# Patient Record
Sex: FEMALE | Race: WHITE | NOT HISPANIC OR LATINO | URBAN - METROPOLITAN AREA
[De-identification: names, ages, dates, MRNs, and addresses within clinical notes are randomized per-mention and may not be internally consistent; named-entity substitution may affect disease eponyms.]

---
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
## 2020-07-19 ENCOUNTER — ERX REFILL RESPONSE (OUTPATIENT)
Age: 20
End: 2020-07-19

## 2020-07-19 RX ORDER — OMEPRAZOLE 40 MG/1
TAKE 1 CAPSULE BY MOUTH EVERY DAY BEFORE A MEAL CAPSULE, DELAYED RELEASE ORAL
Qty: 90 CAPSULE | Refills: 2 | OUTPATIENT

## 2020-07-19 RX ORDER — OMEPRAZOLE 40 MG/1
TAKE 1 CAPSULE BY MOUTH EVERY DAY BEFORE A MEAL CAPSULE, DELAYED RELEASE ORAL
Qty: 30 | Refills: 5 | OUTPATIENT

## 2020-07-28 ENCOUNTER — OFFICE VISIT (OUTPATIENT)
Dept: URBAN - METROPOLITAN AREA TELEHEALTH 2 | Facility: TELEHEALTH | Age: 20
End: 2020-07-28

## 2020-07-28 RX ORDER — HYOSCYAMINE SULFATE 0.12 MG/1
PLACE 1 TABLET UNDER TONGUE BY TRANSLINGUAL ROUTE 2 TIMES A DAY FOR 90 DAYS TABLET, ORALLY DISINTEGRATING ORAL 2
Qty: 180 | Refills: 3 | Status: ACTIVE | COMMUNITY
Start: 2020-05-28 | End: 2021-05-23

## 2020-07-28 RX ORDER — DULOXETINE HCL 20 MG
TAKE 1 CAPSULE (20 MG) BY ORAL ROUTE 2 TIMES PER DAY CAPSULE,DELAYED RELEASE (ENTERIC COATED) ORAL 2
Qty: 0 | Refills: 0 | Status: ACTIVE | COMMUNITY
Start: 1900-01-01

## 2020-07-28 RX ORDER — ONDANSETRON HYDROCHLORIDE 4 MG/1
TAKE 1 TABLETS (4 MG) BY ORAL ROUTE EVERY 8 HOURS AS NEEDED TABLET, FILM COATED ORAL 2
Qty: 60 | Refills: 5 | Status: ACTIVE | COMMUNITY
Start: 2020-05-28 | End: 2020-11-24

## 2020-07-29 ENCOUNTER — OFFICE VISIT (OUTPATIENT)
Dept: URBAN - METROPOLITAN AREA TELEHEALTH 2 | Facility: TELEHEALTH | Age: 20
End: 2020-07-29
Payer: COMMERCIAL

## 2020-07-29 DIAGNOSIS — K58.9 IRRITABLE BOWEL SYNDROME, UNSPECIFIED TYPE: ICD-10-CM

## 2020-07-29 DIAGNOSIS — K31.84 GASTROPARESIS: ICD-10-CM

## 2020-07-29 DIAGNOSIS — R11.0 NAUSEA: ICD-10-CM

## 2020-07-29 DIAGNOSIS — R10.13 DYSPEPSIA: ICD-10-CM

## 2020-07-29 PROCEDURE — G9903 PT SCRN TBCO ID AS NON USER: HCPCS | Performed by: INTERNAL MEDICINE

## 2020-07-29 PROCEDURE — G8427 DOCREV CUR MEDS BY ELIG CLIN: HCPCS | Performed by: INTERNAL MEDICINE

## 2020-07-29 PROCEDURE — G8420 CALC BMI NORM PARAMETERS: HCPCS | Performed by: INTERNAL MEDICINE

## 2020-07-29 PROCEDURE — 1036F TOBACCO NON-USER: CPT | Performed by: INTERNAL MEDICINE

## 2020-07-29 PROCEDURE — 99214 OFFICE O/P EST MOD 30 MIN: CPT | Performed by: INTERNAL MEDICINE

## 2020-07-29 RX ORDER — DESIPRAMINE HYDROCHLORIDE 10 MG/1
1 TABLET TABLET, SUGAR COATED ORAL ONCE A DAY
Qty: 90 | Refills: 0 | OUTPATIENT
Start: 2020-07-29

## 2020-07-29 RX ORDER — OMEPRAZOLE 40 MG/1
TAKE 1 CAPSULE BY MOUTH EVERY DAY BEFORE A MEAL CAPSULE, DELAYED RELEASE ORAL
Qty: 90 CAPSULE | Refills: 2 | Status: ACTIVE | COMMUNITY

## 2020-07-29 RX ORDER — ONDANSETRON HYDROCHLORIDE 4 MG/1
TAKE 1 TABLETS (4 MG) BY ORAL ROUTE EVERY 8 HOURS AS NEEDED TABLET, FILM COATED ORAL 2
Qty: 60 | Refills: 5 | Status: ACTIVE | COMMUNITY
Start: 2020-05-28 | End: 2020-11-24

## 2020-07-29 RX ORDER — DULOXETINE HCL 20 MG
TAKE 1 CAPSULE (20 MG) BY ORAL ROUTE 2 TIMES PER DAY CAPSULE,DELAYED RELEASE (ENTERIC COATED) ORAL 2
Qty: 0 | Refills: 0 | Status: ACTIVE | COMMUNITY
Start: 1900-01-01

## 2020-07-29 RX ORDER — HYOSCYAMINE SULFATE 0.12 MG/1
PLACE 1 TABLET UNDER TONGUE BY TRANSLINGUAL ROUTE 2 TIMES A DAY FOR 90 DAYS TABLET, ORALLY DISINTEGRATING ORAL 2
Qty: 180 | Refills: 3 | Status: ACTIVE | COMMUNITY
Start: 2020-05-28 | End: 2021-05-23

## 2020-07-29 NOTE — HPI-OTHER HISTORIES
This is a 20yoF seen by Shamika PHILLIPS in [place] in 2019 then by myself in 7/2019 for eval of abd pain which began after a UTI and URI. EGD 1/30/2019 by Dr. Saldaña revealed a small HH and gastritis which was bx'd.  Path was   negative for H. pylori and Celiac. RUQ U/S on 2/13/2019 showed normal gallbladder, no ductal dilatation. She was admitted x 1 week to [facility] for eval of chronic abd pain, hematochezia and weight loss in Feb 2019. Records reviewed: EGD few superficial gastric and duodenal ulcers, bx neg for HP; Colon same day with discontinuous areas of nonbleeding ulcerated mucosa in rectum, sigmoid and cecum, normal TI. bx + increased lymphoplastmacytic inflammation in the cecum and ascending colon but no granuloma, crypt distortion or active inflammation. Capsule endoscopy with small ulcers of stomach, duodenum and colon, MRE with no evidence of IBD; calprotectin 181 and CRP 4.6 Pt had a slow GES on 7/26/19- T1/2 of 132 min (normal < 90min) IBD panel not c/w IBD  She presented back in April and had overall been doing "ok" for the last year but still has days with bad adbominal cramping, nausea and diarrhea. Pain was worse after eating and is generalized. She never feels good but has "bad GI" days about once/week. In addition to GI she has been multiple MDs-urology, neurology, gyn_ _recurrent UTIs, cervicitis and yeast infections and has trouble ridding the infections. + migraines. Tried oxybutinin which worsened her GI symptoms so stopped this.  EGD/colon 5/15/2020: normal to TI and normal stomach, esophagus and duodenum Bx gastritis without HP and neg ileitis and colitis  Started her on PPI, levsin and zofran. She used levsin daily but felt it become less effective. Omeprazole did seem to help  but stopped it as no more heartburn or regurg.  Still with LUQ and lower abd pain worse after eating, especially larger meals. Instead of feeling full gets pain feeling and dyspepsia--tried gastroparesis diet and helped some. Associated nausea without vomiting. Bowels normal without bleeding, no more diarrhea. Definately feels like anxiety is adding to GI sx as well as HA.

## 2020-10-05 ENCOUNTER — OFFICE VISIT (OUTPATIENT)
Dept: URBAN - METROPOLITAN AREA TELEHEALTH 2 | Facility: TELEHEALTH | Age: 20
End: 2020-10-05
Payer: COMMERCIAL

## 2020-10-05 DIAGNOSIS — R10.13 DYSPEPSIA: ICD-10-CM

## 2020-10-05 DIAGNOSIS — K29.80 ACUTE DUODENITIS: ICD-10-CM

## 2020-10-05 DIAGNOSIS — R11.0 NAUSEA: ICD-10-CM

## 2020-10-05 DIAGNOSIS — K31.84 GASTROPARESIS: ICD-10-CM

## 2020-10-05 PROCEDURE — G8420 CALC BMI NORM PARAMETERS: HCPCS | Performed by: INTERNAL MEDICINE

## 2020-10-05 PROCEDURE — G8427 DOCREV CUR MEDS BY ELIG CLIN: HCPCS | Performed by: INTERNAL MEDICINE

## 2020-10-05 PROCEDURE — G8482 FLU IMMUNIZE ORDER/ADMIN: HCPCS | Performed by: INTERNAL MEDICINE

## 2020-10-05 PROCEDURE — G9903 PT SCRN TBCO ID AS NON USER: HCPCS | Performed by: INTERNAL MEDICINE

## 2020-10-05 PROCEDURE — 1036F TOBACCO NON-USER: CPT | Performed by: INTERNAL MEDICINE

## 2020-10-05 PROCEDURE — 99214 OFFICE O/P EST MOD 30 MIN: CPT | Performed by: INTERNAL MEDICINE

## 2020-10-05 RX ORDER — DESIPRAMINE HYDROCHLORIDE 10 MG/1
1 TABLET TABLET, SUGAR COATED ORAL ONCE A DAY
Qty: 90 | Refills: 0 | OUTPATIENT

## 2020-10-05 RX ORDER — OMEPRAZOLE 40 MG/1
TAKE 1 CAPSULE BY MOUTH EVERY DAY BEFORE A MEAL CAPSULE, DELAYED RELEASE ORAL
Qty: 90 CAPSULE | Refills: 2 | Status: ACTIVE | COMMUNITY

## 2020-10-05 RX ORDER — DESIPRAMINE HYDROCHLORIDE 10 MG/1
1 TABLET TABLET, SUGAR COATED ORAL ONCE A DAY
Qty: 90 | Refills: 0 | Status: ACTIVE | COMMUNITY
Start: 2020-07-29

## 2020-10-05 RX ORDER — ONDANSETRON HYDROCHLORIDE 4 MG/1
TAKE 1 TABLETS (4 MG) BY ORAL ROUTE EVERY 8 HOURS AS NEEDED TABLET, FILM COATED ORAL 2
Qty: 60 | Refills: 5 | Status: ACTIVE | COMMUNITY
Start: 2020-05-28 | End: 2020-11-24

## 2020-10-05 RX ORDER — DULOXETINE HCL 20 MG
TAKE 1 CAPSULE (20 MG) BY ORAL ROUTE 2 TIMES PER DAY CAPSULE,DELAYED RELEASE (ENTERIC COATED) ORAL 2
Qty: 0 | Refills: 0 | Status: ACTIVE | COMMUNITY
Start: 1900-01-01

## 2020-10-05 RX ORDER — HYOSCYAMINE SULFATE 0.12 MG/1
PLACE 1 TABLET UNDER TONGUE BY TRANSLINGUAL ROUTE 2 TIMES A DAY FOR 90 DAYS TABLET, ORALLY DISINTEGRATING ORAL 2
Qty: 180 | Refills: 3 | Status: ACTIVE | COMMUNITY
Start: 2020-05-28 | End: 2021-05-23

## 2020-10-05 NOTE — HPI-OTHER HISTORIES
This is a 20yoF seen by Shamika PHILLIPS in [place] in 2019 then by myself in 7/2019 for eval of abd pain which began after a UTI and URI. EGD 1/30/2019 by Dr. Saldaña revealed a small HH and gastritis which was bx'd.  Path was     negative for H. pylori and Celiac. RUQ U/S on 2/13/2019 showed normal gallbladder, no ductal dilatation. She  was admitted x 1 week to [facility] for eval of chronic abd pain, hematochezia and weight loss in Feb 2019. Records reviewed: EGD few superficial gastric and duodenal ulcers, bx neg for HP; Colon same day with discontinuous areas of nonbleeding ulcerated mucosa in rectum, sigmoid and cecum, normal TI. bx + increased lymphoplastmacytic inflammation in the cecum and ascending colon but no granuloma, crypt distortion or active inflammation. Capsule endoscopy with small ulcers of stomach, duodenum and colon, MRE with no evidence of IBD; calprotectin 181 and CRP 4.6 Pt had a slow GES on 7/26/19- T1/2 of 132 min (normal < 90min) IBD panel not c/w IBD  She presented back in April with some bad adbominal cramping, nausea and diarrhea. Pain was worse after eating and is generalized. In addition to GI she has been multiple MDs-urology, neurology, gyn--recurrent UTIs, cervicitis and yeast infections and has trouble ridding the infections. + migraines. Still seeing urology and gyn.  EGD/colon 5/15/2020: normal to TI and normal stomach, esophagus and duodenum Bx gastritis without HP and neg ileitis and colitis  She was given norpramin but actually felt increased anxiety stop stopped it but wants to re-try it as thinks maybe anxiety was just higher at that time. Using FDgard BID and gastroparesis diet, which has helped with her post-prandial pain, fullness and nausea. BM daily and no GIB. Still notes with anxiety her GI sx increase-swetha the pain. No vomiting or weight loss. Does have some dyspepsia during the day without regurg or dysphagia.

## 2020-10-30 ENCOUNTER — ERX REFILL RESPONSE (OUTPATIENT)
Dept: URBAN - METROPOLITAN AREA TELEHEALTH 2 | Facility: TELEHEALTH | Age: 20
End: 2020-10-30

## 2020-10-30 RX ORDER — DESIPRAMINE HYDROCHLORIDE 10 MG/1
1 TABLET TABLET ORAL ONCE A DAY
Qty: 90 | Refills: 0

## 2020-11-16 ENCOUNTER — OFFICE VISIT (OUTPATIENT)
Dept: URBAN - METROPOLITAN AREA TELEHEALTH 2 | Facility: TELEHEALTH | Age: 20
End: 2020-11-16
Payer: COMMERCIAL

## 2020-11-16 DIAGNOSIS — K58.9 IRRITABLE BOWEL SYNDROME, UNSPECIFIED TYPE: ICD-10-CM

## 2020-11-16 DIAGNOSIS — F41.8 ANXIETY ABOUT HEALTH: ICD-10-CM

## 2020-11-16 DIAGNOSIS — K31.84 GASTROPARESIS: ICD-10-CM

## 2020-11-16 DIAGNOSIS — K29.90 GASTRITIS AND DUODENITIS: ICD-10-CM

## 2020-11-16 DIAGNOSIS — R10.13 DYSPEPSIA: ICD-10-CM

## 2020-11-16 DIAGNOSIS — R11.0 NAUSEA: ICD-10-CM

## 2020-11-16 DIAGNOSIS — F41.9 ANXIETY: ICD-10-CM

## 2020-11-16 PROCEDURE — G8420 CALC BMI NORM PARAMETERS: HCPCS | Performed by: INTERNAL MEDICINE

## 2020-11-16 PROCEDURE — 99213 OFFICE O/P EST LOW 20 MIN: CPT | Performed by: INTERNAL MEDICINE

## 2020-11-16 PROCEDURE — G8482 FLU IMMUNIZE ORDER/ADMIN: HCPCS | Performed by: INTERNAL MEDICINE

## 2020-11-16 PROCEDURE — 1036F TOBACCO NON-USER: CPT | Performed by: INTERNAL MEDICINE

## 2020-11-16 PROCEDURE — G8427 DOCREV CUR MEDS BY ELIG CLIN: HCPCS | Performed by: INTERNAL MEDICINE

## 2020-11-16 PROCEDURE — G9903 PT SCRN TBCO ID AS NON USER: HCPCS | Performed by: INTERNAL MEDICINE

## 2020-11-16 RX ORDER — OMEPRAZOLE 40 MG/1
TAKE 1 CAPSULE BY MOUTH EVERY DAY BEFORE A MEAL CAPSULE, DELAYED RELEASE ORAL
Qty: 90 CAPSULE | Refills: 2 | Status: ACTIVE | COMMUNITY

## 2020-11-16 RX ORDER — DESIPRAMINE HYDROCHLORIDE 10 MG/1
1 TABLET TABLET, SUGAR COATED ORAL ONCE A DAY
Qty: 90 | Refills: 0 | OUTPATIENT

## 2020-11-16 RX ORDER — ONDANSETRON HYDROCHLORIDE 4 MG/1
TAKE 1 TABLETS (4 MG) BY ORAL ROUTE EVERY 8 HOURS AS NEEDED TABLET, FILM COATED ORAL 2
Qty: 60 | Refills: 5 | Status: ACTIVE | COMMUNITY
Start: 2020-05-28 | End: 2020-11-24

## 2020-11-16 RX ORDER — DULOXETINE HCL 20 MG
TAKE 1 CAPSULE (20 MG) BY ORAL ROUTE 2 TIMES PER DAY CAPSULE,DELAYED RELEASE (ENTERIC COATED) ORAL 2
Qty: 0 | Refills: 0 | Status: ACTIVE | COMMUNITY
Start: 1900-01-01

## 2020-11-16 RX ORDER — DESIPRAMINE HYDROCHLORIDE 10 MG/1
1 TABLET TABLET ORAL ONCE A DAY
Qty: 90 | Refills: 0 | Status: ACTIVE | COMMUNITY

## 2020-11-16 RX ORDER — HYOSCYAMINE SULFATE 0.12 MG/1
PLACE 1 TABLET UNDER TONGUE BY TRANSLINGUAL ROUTE 2 TIMES A DAY FOR 90 DAYS TABLET, ORALLY DISINTEGRATING ORAL 2
Qty: 180 | Refills: 3 | Status: ACTIVE | COMMUNITY
Start: 2020-05-28 | End: 2021-05-23

## 2020-11-16 NOTE — HPI-OTHER HISTORIES
[facility] 20[facility]/2019 for eval of abd pain which began after a UTI and URI. EGD 1/30/2019 by Dr. Saldaña revealed a small HH and gastritis which was bx'd.  Path negative for H. pylori and Celiac. RUQ U/S on 2/13/2019 WNL. She  was admitted x 1 week to [facility] for eval of chronic abd pain, hematochezia and weight loss in Feb 2019. Records reviewed: EGD few superficial gastric and duodenal ulcers, bx neg for HP; Colon same day with discontinuous areas of nonbleeding ulcerated mucosa in rectum, sigmoid and cecum, normal TI. bx + increased lymphoplastmacytic inflammation in the cecum and ascending colon but no granuloma, crypt distortion or active inflammation. Capsule endoscopy with small ulcers of stomach, duodenum and colon, MRE with no evidence of IBD; calprotectin 181 and CRP 4.6 Pt had a slow GES on 7/26/19- T1/2 of 132 min (normal < 90min) IBD panel not c/w IBD  She presented back in April 2020 with some bad adbominal cramping, nausea and diarrhea. Pain was worse after eating and is generalized. In addition to GI she has been multiple MDs-urology, neurology, gyn--recurrent UTIs, cervicitis and yeast infections and has trouble ridding the infections. + migraines. EGD/colon 5/15/2020: normal to TI and normal stomach, esophagus and duodenum Bx gastritis without HP and neg ileitis and colitis  She was given norpramin 10mg 6 weeks ago and does think this is helping her anxiety and overall IBS/Dyspepsia. Also using FDgard BID and gastroparesis diet with sign improvement in post-prandial pain, fullness and nausea. She has occasional dyspepsia after eating but no vomiting or weight loss and improved with TCA. BM daily and no GIB.

## 2021-03-17 ENCOUNTER — ERX REFILL RESPONSE (OUTPATIENT)
Dept: URBAN - METROPOLITAN AREA CLINIC 92 | Facility: CLINIC | Age: 21
End: 2021-03-17

## 2021-03-17 RX ORDER — DESIPRAMINE HYDROCHLORIDE 10 MG/1
1 TABLET TABLET, SUGAR COATED ORAL ONCE A DAY
Qty: 90 | Refills: 0

## 2021-05-17 ENCOUNTER — TELEPHONE ENCOUNTER (OUTPATIENT)
Dept: URBAN - METROPOLITAN AREA CLINIC 92 | Facility: CLINIC | Age: 21
End: 2021-05-17

## 2021-05-17 ENCOUNTER — OFFICE VISIT (OUTPATIENT)
Dept: URBAN - METROPOLITAN AREA TELEHEALTH 2 | Facility: TELEHEALTH | Age: 21
End: 2021-05-17
Payer: COMMERCIAL

## 2021-05-17 VITALS — WEIGHT: 130 LBS | HEIGHT: 67 IN | BODY MASS INDEX: 20.4 KG/M2

## 2021-05-17 DIAGNOSIS — K31.84 GASTROPARESIS: ICD-10-CM

## 2021-05-17 DIAGNOSIS — K29.90 GASTRITIS AND DUODENITIS: ICD-10-CM

## 2021-05-17 DIAGNOSIS — F41.9 ANXIETY: ICD-10-CM

## 2021-05-17 DIAGNOSIS — R10.13 DYSPEPSIA: ICD-10-CM

## 2021-05-17 DIAGNOSIS — R11.0 NAUSEA: ICD-10-CM

## 2021-05-17 DIAGNOSIS — F41.8 ANXIETY ABOUT HEALTH: ICD-10-CM

## 2021-05-17 DIAGNOSIS — K58.9 IRRITABLE BOWEL SYNDROME, UNSPECIFIED TYPE: ICD-10-CM

## 2021-05-17 PROCEDURE — 99213 OFFICE O/P EST LOW 20 MIN: CPT | Performed by: INTERNAL MEDICINE

## 2021-05-17 RX ORDER — OMEPRAZOLE 40 MG/1
TAKE 1 CAPSULE BY MOUTH EVERY DAY BEFORE A MEAL CAPSULE, DELAYED RELEASE ORAL
Qty: 90 CAPSULE | Refills: 2 | Status: ON HOLD | COMMUNITY

## 2021-05-17 RX ORDER — HYOSCYAMINE SULFATE 0.12 MG/1
PLACE 1 TABLET UNDER TONGUE BY TRANSLINGUAL ROUTE 2 TIMES A DAY FOR 90 DAYS TABLET, ORALLY DISINTEGRATING ORAL 2
Qty: 180 | Refills: 3 | Status: ON HOLD | COMMUNITY
Start: 2020-05-28 | End: 2021-05-23

## 2021-05-17 RX ORDER — DESIPRAMINE HYDROCHLORIDE 10 MG/1
1 TABLET TABLET, SUGAR COATED ORAL ONCE A DAY
Qty: 90 | Refills: 0 | Status: ON HOLD | COMMUNITY

## 2021-05-17 RX ORDER — DESIPRAMINE HYDROCHLORIDE 10 MG/1
1 TABLET TABLET, SUGAR COATED ORAL ONCE A DAY
Qty: 90 | Refills: 0 | OUTPATIENT

## 2021-05-17 RX ORDER — DULOXETINE HCL 20 MG
TAKE 1 CAPSULE (20 MG) BY ORAL ROUTE 2 TIMES PER DAY CAPSULE,DELAYED RELEASE (ENTERIC COATED) ORAL 2
Qty: 0 | Refills: 0 | Status: ACTIVE | COMMUNITY
Start: 1900-01-01

## 2021-05-17 NOTE — HPI-OTHER HISTORIES
This is a 21yoF seen by Shamika PHILLIPS in [place] in 2019 then transferred her care to [facility]. She has had abd pain for years.  EGD 1/30/2019 by Dr. Saldaña revealed a small HH and gastritis, neg for H. pylori and Celiac.  RUQ U/S 2/2019 WNL. She  was to [facility] in 2019 for eval of chronic abd pain, hematochezia and weight loss. Records reviewed: EGD few superficial gastric and duodenal ulcers, bx neg for HP; Colon same day with discontinuous areas of nonbleeding ulcerated mucosa in rectum, sigmoid and cecum, normal TI. bx + increased lymphoplastmacytic inflammation in the cecum and ascending colon but no granuloma, crypt distortion or active inflammation. Capsule endoscopy with small ulcers of stomach, duodenum and colon, MRE with no evidence of IBD; Calprotectin 181 and CRP 4.6. Pt had a slow GES on 7/26/19- T1/2 of 132 min (normal < 90min) IBD panel not c/w IBD.  In addition to GI she has been multiple MDs-urology, neurology, gyn--recurrent UTIs, cervicitis and yeast infections and has trouble ridding the infections. + migraines. Due to persistent pain and GERD sx she had a repeat  EGD/colon 5/15/2020: normal to TI and normal stomach, esophagus and duodenum, Bx gastritis without HP and neg ileitis and colitis  She was given norpramin 10mg w/ improvement in IBS/Dyspepsia and anxiety. She was also using FDgard BID and gastroparesis diet but she stopped FDgard. She now notes LUQ/ham-umbilical pain about 1-2/week, worse with po intake but no specific triggers but triggers migraines. She is doing a food sensitvity test. No N/V, weight loss. BM daily and non-bloody

## 2021-06-28 ENCOUNTER — WEB ENCOUNTER (OUTPATIENT)
Dept: URBAN - METROPOLITAN AREA CLINIC 92 | Facility: CLINIC | Age: 21
End: 2021-06-28

## 2021-06-29 ENCOUNTER — OFFICE VISIT (OUTPATIENT)
Dept: URBAN - METROPOLITAN AREA CLINIC 92 | Facility: CLINIC | Age: 21
End: 2021-06-29
Payer: COMMERCIAL

## 2021-06-29 ENCOUNTER — WEB ENCOUNTER (OUTPATIENT)
Dept: URBAN - METROPOLITAN AREA CLINIC 92 | Facility: CLINIC | Age: 21
End: 2021-06-29

## 2021-06-29 DIAGNOSIS — R10.13 DYSPEPSIA: ICD-10-CM

## 2021-06-29 DIAGNOSIS — R11.0 NAUSEA: ICD-10-CM

## 2021-06-29 DIAGNOSIS — K58.9 IRRITABLE BOWEL SYNDROME, UNSPECIFIED TYPE: ICD-10-CM

## 2021-06-29 DIAGNOSIS — K31.84 GASTROPARESIS: ICD-10-CM

## 2021-06-29 DIAGNOSIS — K29.90 GASTRITIS AND DUODENITIS: ICD-10-CM

## 2021-06-29 DIAGNOSIS — F41.9 ANXIETY: ICD-10-CM

## 2021-06-29 DIAGNOSIS — R12 HEARTBURN: ICD-10-CM

## 2021-06-29 PROCEDURE — 99214 OFFICE O/P EST MOD 30 MIN: CPT | Performed by: INTERNAL MEDICINE

## 2021-06-29 RX ORDER — DESIPRAMINE HYDROCHLORIDE 10 MG/1
2 TABLETS TABLET, SUGAR COATED ORAL ONCE A DAY
Qty: 180 TABLET | Refills: 1 | OUTPATIENT

## 2021-06-29 RX ORDER — DULOXETINE HCL 20 MG
TAKE 1 CAPSULE (20 MG) BY ORAL ROUTE 2 TIMES PER DAY CAPSULE,DELAYED RELEASE (ENTERIC COATED) ORAL 2
Qty: 0 | Refills: 0 | Status: ACTIVE | COMMUNITY
Start: 1900-01-01

## 2021-06-29 RX ORDER — DESIPRAMINE HYDROCHLORIDE 10 MG/1
1 TABLET TABLET, SUGAR COATED ORAL ONCE A DAY
Qty: 90 | Refills: 0 | Status: ACTIVE | COMMUNITY

## 2021-06-29 RX ORDER — FAMOTIDINE 40 MG/1
1 TABLET AT BEDTIME TABLET, FILM COATED ORAL ONCE A DAY
Qty: 30 | OUTPATIENT
Start: 2021-06-29

## 2021-06-29 RX ORDER — DESIPRAMINE HYDROCHLORIDE 10 MG/1
1 TABLET TABLET, SUGAR COATED ORAL ONCE A DAY
Qty: 90 | Refills: 0 | Status: ON HOLD | COMMUNITY

## 2021-06-29 RX ORDER — OMEPRAZOLE 40 MG/1
1 CAPSULE 30 MINUTES BEFORE MORNING MEAL CAPSULE, DELAYED RELEASE ORAL ONCE A DAY
Qty: 30 | Refills: 1 | OUTPATIENT
Start: 2021-06-29

## 2021-06-29 RX ORDER — OMEPRAZOLE 40 MG/1
TAKE 1 CAPSULE BY MOUTH EVERY DAY BEFORE A MEAL CAPSULE, DELAYED RELEASE ORAL
Qty: 90 CAPSULE | Refills: 2 | Status: ON HOLD | COMMUNITY

## 2021-06-29 NOTE — HPI-OTHER HISTORIES
This is a 21yoF seen by Shamika PHILLIPS in [place] in 2019 then transferred her care to [facility]. She has had abd pain for years.   EGD 1/30/2019 by Dr. Saldaña revealed a small HH and gastritis, neg for H. pylori and Celiac.   RUQ U/S 2/2019 WNL. She  was to [facility] in 2019 for eval of chronic abd pain, hematochezia and weight loss. Records reviewed: EGD few superficial gastric and duodenal ulcers, bx neg for HP; Colon same day with discontinuous areas of nonbleeding ulcerated mucosa in rectum, sigmoid and cecum, normal TI. bx + increased lymphoplastmacytic inflammation in the cecum and ascending colon but no granuloma, crypt distortion or active inflammation. Capsule endoscopy with small ulcers of stomach, duodenum and colon, MRE with no evidence of IBD; Calprotectin 181 and CRP 4.6. Pt had a slow GES on 7/26/19- T1/2 of 132 min (normal < 90min) IBD panel not c/w IBD.  In addition to GI she has been multiple MDs-urology, neurology, gyn--recurrent UTIs, cervicitis and yeast infections and has trouble ridding the infections. + migraines. Due to persistent pain and GERD sx she had a repeat  EGD/colon 5/15/2020: normal to TI and normal stomach, esophagus and duodenum, Bx gastritis without HP and neg ileitis and colitis  She was given norpramin 10mg w/ improvement in IBS/Dyspepsia and anxiety but notes this weekend she was visiting a friend and eating less well and drinking more and notes increase in a feeling of stomach inflammation and some pain in the LUQ. Mild nausea but no vomiting. Sx worse with eating. Also increased heartburn but no regurg or dysphagia. On IBgard BID. Using advil 1-2/week. Last week also started to have looser stools up to 2/day. No GIB. Stress is much higher right now and thinks increasing her sx

## 2021-07-23 ENCOUNTER — ERX REFILL RESPONSE (OUTPATIENT)
Dept: URBAN - METROPOLITAN AREA CLINIC 92 | Facility: CLINIC | Age: 21
End: 2021-07-23

## 2021-07-23 RX ORDER — OMEPRAZOLE 40 MG/1
1 CAPSULE 30 MINUTES BEFORE MORNING MEAL CAPSULE, DELAYED RELEASE ORAL ONCE A DAY
Qty: 30 | Refills: 1 | OUTPATIENT

## 2021-07-23 RX ORDER — FAMOTIDINE 40 MG/1
1 TABLET AT BEDTIME TABLET, FILM COATED ORAL ONCE A DAY
Qty: 30 | OUTPATIENT

## 2021-07-23 RX ORDER — FAMOTIDINE 40 MG/1
TAKE 1 TABLET AT BEDTIME ORALLY ONCE A DAY 30 DAY(S) TABLET, FILM COATED ORAL
Qty: 30 TABLET | Refills: 4 | OUTPATIENT

## 2021-07-23 RX ORDER — OMEPRAZOLE 40 MG/1
TAKE 1 CAPSULE 30 MINUTES BEFORE MORNING MEAL ORALLY ONCE A DAY 30 DAY(S) CAPSULE, DELAYED RELEASE ORAL
Qty: 30 CAPSULE | Refills: 2 | OUTPATIENT

## 2021-07-27 ENCOUNTER — OFFICE VISIT (OUTPATIENT)
Dept: URBAN - METROPOLITAN AREA TELEHEALTH 2 | Facility: TELEHEALTH | Age: 21
End: 2021-07-27
Payer: COMMERCIAL

## 2021-07-27 DIAGNOSIS — K58.9 IRRITABLE BOWEL SYNDROME, UNSPECIFIED TYPE: ICD-10-CM

## 2021-07-27 DIAGNOSIS — R10.13 DYSPEPSIA: ICD-10-CM

## 2021-07-27 DIAGNOSIS — R11.0 NAUSEA: ICD-10-CM

## 2021-07-27 DIAGNOSIS — R12 HEARTBURN: ICD-10-CM

## 2021-07-27 PROCEDURE — 99213 OFFICE O/P EST LOW 20 MIN: CPT | Performed by: INTERNAL MEDICINE

## 2021-07-27 RX ORDER — DESIPRAMINE HYDROCHLORIDE 10 MG/1
1 TABLET TABLET, SUGAR COATED ORAL ONCE A DAY
Qty: 90 | Refills: 0 | Status: ON HOLD | COMMUNITY

## 2021-07-27 RX ORDER — OMEPRAZOLE 40 MG/1
TAKE 1 CAPSULE 30 MINUTES BEFORE MORNING MEAL ORALLY ONCE A DAY 30 DAY(S) CAPSULE, DELAYED RELEASE ORAL
Qty: 30 CAPSULE | Refills: 2 | Status: ACTIVE | COMMUNITY

## 2021-07-27 RX ORDER — FAMOTIDINE 40 MG/1
TAKE 1 TABLET AT BEDTIME ORALLY ONCE A DAY 30 DAY(S) TABLET, FILM COATED ORAL
Qty: 30 TABLET | Refills: 4 | Status: ACTIVE | COMMUNITY

## 2021-07-27 RX ORDER — DESIPRAMINE HYDROCHLORIDE 10 MG/1
2 TABLETS TABLET, SUGAR COATED ORAL ONCE A DAY
Qty: 180 TABLET | Refills: 1 | Status: ACTIVE | COMMUNITY

## 2021-07-27 RX ORDER — FAMOTIDINE 40 MG/1
1 TABLET AT BEDTIME TABLET, FILM COATED ORAL ONCE A DAY
Qty: 30 | OUTPATIENT

## 2021-07-27 RX ORDER — OMEPRAZOLE 40 MG/1
1 CAPSULE 30 MINUTES BEFORE MORNING MEAL CAPSULE, DELAYED RELEASE ORAL ONCE A DAY
Qty: 30 | Refills: 1 | OUTPATIENT

## 2021-07-27 RX ORDER — DESIPRAMINE HYDROCHLORIDE 10 MG/1
2 TABLETS TABLET, SUGAR COATED ORAL ONCE A DAY
Qty: 180 TABLET | Refills: 1 | OUTPATIENT

## 2021-07-27 RX ORDER — DULOXETINE HCL 20 MG
TAKE 1 CAPSULE (20 MG) BY ORAL ROUTE 2 TIMES PER DAY CAPSULE,DELAYED RELEASE (ENTERIC COATED) ORAL 2
Qty: 0 | Refills: 0 | Status: ACTIVE | COMMUNITY
Start: 1900-01-01

## 2021-07-27 NOTE — HPI-OTHER HISTORIES
This is a 21yoF who presents for f/u of gastroparesis, GERD and IBS-D. EGD 1/30/2019 by Dr. Saldaña revealed a small HH and gastritis, neg for H. pylori and celiac.   RUQ U/S 2/2019 WNL.  She  was to [facility] in 2019 for eval of chronic abd pain, hematochezia and weight loss. Records reviewed: EGD few superficial gastric and duodenal ulcers, bx neg for HP; Colon same day with discontinuous areas of nonbleeding ulcerated mucosa in rectum, sigmoid and cecum, normal TI. bx + increased lymphoplastmacytic inflammation in the cecum and ascending colon but no granuloma, crypt distortion or active inflammation. Capsule endoscopy with small ulcers of stomach, duodenum and colon, MRE with no evidence of IBD; Calprotectin 181, CRP 4.6. Pt had a slow GES on 7/26/19- T1/2 of 132 min (normal < 90min) IBD panel not c/w IBD.  She sees neurology for migraines, getting botox.  Due to persistent pain and GERD sx she had a repeat EGD/colon 5/15/2020: normal to TI and normal stomach, esophagus and duodenum, Bx gastritis without HP and neg ileitis and colitis  She was given norpramin 10mg w/ improvement in IBS/dyspepsia and anxiety but last month noted an increase in a feeling of stomach inflammation, nausea, LUQ pain, heartburn and looser stools, triggered by stress and poor eating. Was using advil 1-2/week so asked to stop this, start PPI, H2B and increase TCA to 20mg. Notes on this 50% improvement overall but stress is still high and has some globus, regurg and post-prandial bloating. BM 2/day, formed to loose but non-bloody

## 2021-07-27 NOTE — PHYSICAL EXAM NECK/THYROID:
RN called pt back to let him know that MD Stephens wants him on Terazosin 10 mg daily. Pt should not be taking bumex with it. Pt did express concerns that he thinks he is going to fill back up with fluid again. RN advised that he record his weights and if he starts noticing edema or his weight jumping up to let RN know. Pt also advised to check bp at home. Pt does not have a cuff. RN did recommend pt get one to record bp at home. Pt said he would look into getting one.     Need to get pt in first avaliable appointment in Nov 2020.    normal appearance , no deformities

## 2021-08-26 ENCOUNTER — ERX REFILL RESPONSE (OUTPATIENT)
Dept: URBAN - METROPOLITAN AREA CLINIC 92 | Facility: CLINIC | Age: 21
End: 2021-08-26

## 2021-08-26 RX ORDER — OMEPRAZOLE 40 MG/1
TAKE 1 CAPSULE 30 MINUTES BEFORE MORNING MEAL ORALLY ONCE A DAY 30 DAY(S) CAPSULE, DELAYED RELEASE ORAL
Qty: 30 CAPSULE | Refills: 2 | OUTPATIENT

## 2021-09-20 ENCOUNTER — ERX REFILL RESPONSE (OUTPATIENT)
Dept: URBAN - METROPOLITAN AREA CLINIC 92 | Facility: CLINIC | Age: 21
End: 2021-09-20

## 2021-09-20 RX ORDER — OMEPRAZOLE 40 MG/1
TAKE 1 CAPSULE 30 MINUTES BEFORE MORNING MEAL ORALLY ONCE A DAY 30 DAY(S) CAPSULE, DELAYED RELEASE ORAL
Qty: 90 CAPSULE | Refills: 1 | OUTPATIENT

## 2021-09-20 RX ORDER — OMEPRAZOLE 40 MG/1
TAKE 1 CAPSULE 30 MINUTES BEFORE MORNING MEAL ORALLY ONCE A DAY 30 DAY(S) CAPSULE, DELAYED RELEASE ORAL
Qty: 30 CAPSULE | Refills: 2 | OUTPATIENT

## 2021-09-28 ENCOUNTER — OFFICE VISIT (OUTPATIENT)
Dept: URBAN - METROPOLITAN AREA TELEHEALTH 2 | Facility: TELEHEALTH | Age: 21
End: 2021-09-28
Payer: COMMERCIAL

## 2021-09-28 DIAGNOSIS — K29.90 GASTRITIS AND DUODENITIS: ICD-10-CM

## 2021-09-28 DIAGNOSIS — K58.8 OTHER IRRITABLE BOWEL SYNDROME: ICD-10-CM

## 2021-09-28 DIAGNOSIS — R11.0 NAUSEA: ICD-10-CM

## 2021-09-28 DIAGNOSIS — K31.84 GASTROPARESIS: ICD-10-CM

## 2021-09-28 DIAGNOSIS — R12 HEARTBURN: ICD-10-CM

## 2021-09-28 DIAGNOSIS — F41.9 ANXIETY: ICD-10-CM

## 2021-09-28 DIAGNOSIS — R10.13 DYSPEPSIA: ICD-10-CM

## 2021-09-28 PROCEDURE — 99213 OFFICE O/P EST LOW 20 MIN: CPT | Performed by: INTERNAL MEDICINE

## 2021-09-28 RX ORDER — DULOXETINE HCL 20 MG
TAKE 1 CAPSULE (20 MG) BY ORAL ROUTE 2 TIMES PER DAY CAPSULE,DELAYED RELEASE (ENTERIC COATED) ORAL 2
Qty: 0 | Refills: 0 | Status: ACTIVE | COMMUNITY
Start: 1900-01-01

## 2021-09-28 RX ORDER — FAMOTIDINE 40 MG/1
1 TABLET AT BEDTIME TABLET, FILM COATED ORAL ONCE A DAY
Qty: 30 | Status: ACTIVE | COMMUNITY

## 2021-09-28 RX ORDER — FAMOTIDINE 40 MG/1
TAKE 1 TABLET AT BEDTIME ORALLY ONCE A DAY 30 DAY(S) TABLET, FILM COATED ORAL
Qty: 30 TABLET | Refills: 4 | Status: ACTIVE | COMMUNITY

## 2021-09-28 RX ORDER — FAMOTIDINE 40 MG/1
1 TABLET AT BEDTIME TABLET, FILM COATED ORAL ONCE A DAY
Qty: 30 | OUTPATIENT

## 2021-09-28 RX ORDER — DESIPRAMINE HYDROCHLORIDE 10 MG/1
1 TABLET TABLET, SUGAR COATED ORAL ONCE A DAY
Qty: 90 | Refills: 0 | Status: ON HOLD | COMMUNITY

## 2021-09-28 RX ORDER — OMEPRAZOLE 40 MG/1
TAKE 1 CAPSULE 30 MINUTES BEFORE MORNING MEAL ORALLY ONCE A DAY 30 DAY(S) CAPSULE, DELAYED RELEASE ORAL
Qty: 90 CAPSULE | Refills: 1 | Status: ACTIVE | COMMUNITY

## 2021-09-28 RX ORDER — DESIPRAMINE HYDROCHLORIDE 10 MG/1
2 TABLETS TABLET, SUGAR COATED ORAL ONCE A DAY
Qty: 180 TABLET | Refills: 1 | OUTPATIENT

## 2021-09-28 RX ORDER — OMEPRAZOLE 40 MG/1
1 CAPSULE 30 MINUTES BEFORE MORNING MEAL CAPSULE, DELAYED RELEASE ORAL ONCE A DAY
Qty: 30 | Refills: 1 | OUTPATIENT

## 2021-09-28 RX ORDER — DESIPRAMINE HYDROCHLORIDE 10 MG/1
2 TABLETS TABLET, SUGAR COATED ORAL ONCE A DAY
Qty: 180 TABLET | Refills: 1 | Status: ACTIVE | COMMUNITY

## 2021-09-28 NOTE — HPI-OTHER HISTORIES
This is a 21yoF who presents for f/u of gastroparesis, GERD and IBS-D.  EGD 1/30/2019 by Dr. Saldaña revealed a small HH and gastritis, neg for H. pylori and celiac.    RUQ U/S 2/2019 WNL.   She  was to [facility] in 2019 for eval of chronic abd pain, hematochezia and weight loss. Records reviewed: EGD few superficial gastric and duodenal ulcers, bx neg for HP; Colon same day with discontinuous areas of nonbleeding ulcerated mucosa in rectum, sigmoid and cecum, normal TI. bx + increased lymphoplastmacytic inflammation in the cecum and ascending colon but no granuloma, crypt distortion or active inflammation. Capsule endoscopy with small ulcers of stomach, duodenum and colon, MRE with no evidence of IBD; Calprotectin 181, CRP 4.6. Pt had a slow GES on 7/26/19- T1/2 of 132 min (normal < 90min) IBD panel not c/w IBD.  She sees neurology for migraines, getting botox with some improvement, 3rd one pending.  Repeat EGD/colon 5/15/2020: normal to TI and normal stomach, esophagus and duodenum, Bx gastritis without HP and neg ileitis and colitis  She was given norpramin 10mg w/ improvement in IBS/dyspepsia and anxiety but seen in July with 1m of stomach inflammation, nausea, LUQ pain, heartburn and looser stools, triggered by stress and poor eating. Was using advil 1-2/week so asked to stop this, start PPI, H2B and increase TCA to 20mg.  She now notes sign improvement in pain and nausea but still feels some reflux and "taste of acid" possibly triggered by gluten, about once every 1-2 weeks.  BM 2/day, formed, non-bloody

## 2021-10-18 ENCOUNTER — ERX REFILL RESPONSE (OUTPATIENT)
Dept: URBAN - METROPOLITAN AREA CLINIC 92 | Facility: CLINIC | Age: 21
End: 2021-10-18

## 2021-10-18 RX ORDER — DESIPRAMINE HYDROCHLORIDE 10 MG/1
TAKE 2 TABLETS ORALLY ONCE A DAY 90 DAYS TABLET, FILM COATED ORAL
Qty: 180 TABLET | Refills: 1 | OUTPATIENT

## 2021-10-25 ENCOUNTER — ERX REFILL RESPONSE (OUTPATIENT)
Dept: URBAN - METROPOLITAN AREA CLINIC 92 | Facility: CLINIC | Age: 21
End: 2021-10-25

## 2021-10-25 RX ORDER — FAMOTIDINE 40 MG/1
TAKE 1 TABLET AT BEDTIME ORALLY ONCE A DAY 30 DAY(S) TABLET, FILM COATED ORAL
Qty: 30 TABLET | Refills: 4 | OUTPATIENT

## 2021-10-25 RX ORDER — FAMOTIDINE 40 MG/1
TAKE 1 TABLET BY MOUTH AT BEDTIME TABLET, FILM COATED ORAL
Qty: 90 TABLET | Refills: 2 | OUTPATIENT

## 2021-10-26 ENCOUNTER — ERX REFILL RESPONSE (OUTPATIENT)
Dept: URBAN - METROPOLITAN AREA CLINIC 92 | Facility: CLINIC | Age: 21
End: 2021-10-26

## 2021-10-26 RX ORDER — DESIPRAMINE HYDROCHLORIDE 10 MG/1
TAKE 2 TABLETS ORALLY ONCE A DAY 90 DAYS TABLET, FILM COATED ORAL
Qty: 180 TABLET | Refills: 1 | OUTPATIENT

## 2021-12-01 ENCOUNTER — P2P PATIENT RECORD (OUTPATIENT)
Age: 21
End: 2021-12-01

## 2021-12-08 ENCOUNTER — OFFICE VISIT (OUTPATIENT)
Dept: URBAN - METROPOLITAN AREA CLINIC 92 | Facility: CLINIC | Age: 21
End: 2021-12-08

## 2021-12-08 RX ORDER — OMEPRAZOLE 40 MG/1
1 CAPSULE 30 MINUTES BEFORE MORNING MEAL CAPSULE, DELAYED RELEASE ORAL ONCE A DAY
Qty: 30 | Refills: 1 | OUTPATIENT

## 2021-12-08 RX ORDER — FAMOTIDINE 40 MG/1
1 TABLET AT BEDTIME TABLET, FILM COATED ORAL ONCE A DAY
Qty: 30 | OUTPATIENT

## 2021-12-08 RX ORDER — DESIPRAMINE HYDROCHLORIDE 10 MG/1
2 TABLETS TABLET, SUGAR COATED ORAL ONCE A DAY
Qty: 180 TABLET | Refills: 1 | OUTPATIENT

## 2021-12-08 NOTE — HPI-OTHER HISTORIES
This is a 21yoF who presents for f/u of gastroparesis, GERD and IBS-D.  EGD 1/30/2019 by Dr. Saldaña revealed a small HH and gastritis, neg for H. pylori and celiac.    RUQ U/S 2/2019 WNL.   She  was to [facility] in 2019 for eval of chronic abd pain, hematochezia and weight loss. Records reviewed: EGD few superficial gastric and duodenal ulcers, bx neg for HP; Colon same day with discontinuous areas of nonbleeding ulcerated mucosa in rectum, sigmoid and cecum, normal TI. bx + increased lymphoplastmacytic inflammation in the cecum and ascending colon but no granuloma, crypt distortion or active inflammation. Capsule endoscopy with small ulcers of stomach, duodenum and colon, MRE with no evidence of IBD; Calprotectin 181, CRP 4.6. Pt had a slow GES on 7/26/19- T1/2 of 132 min (normal < 90min) IBD panel not c/w IBD.  She sees neurology for migraines, getting botox with some improvement, 3rd one pending.  Repeat EGD/colon 5/15/2020: normal to TI and normal stomach, esophagus and duodenum, Bx gastritis without HP and neg ileitis and colitis  She was given norpramin 10mg w/ improvement in IBS/dyspepsia and anxiety but seen in July with 1m of stomach inflammation, nausea, LUQ pain, heartburn and looser stools, triggered by stress and poor eating. Was using advil 1-2/week so asked to stop this, start PPI, H2B and increase TCA to 20mg.  She now notes sign improvement in pain and nausea but still feels some reflux and "taste of acid" possibly triggered by gluten, about once every 1-2 weeks.  BM 2/day, formed, non-bloody

## 2022-02-01 ENCOUNTER — OFFICE VISIT (OUTPATIENT)
Dept: URBAN - METROPOLITAN AREA TELEHEALTH 2 | Facility: TELEHEALTH | Age: 22
End: 2022-02-01
Payer: COMMERCIAL

## 2022-02-01 VITALS — BODY MASS INDEX: 21.19 KG/M2 | HEIGHT: 67 IN | WEIGHT: 135 LBS

## 2022-02-01 DIAGNOSIS — K58.9 IRRITABLE BOWEL SYNDROME, UNSPECIFIED TYPE: ICD-10-CM

## 2022-02-01 DIAGNOSIS — K31.84 GASTROPARESIS: ICD-10-CM

## 2022-02-01 DIAGNOSIS — R11.0 NAUSEA: ICD-10-CM

## 2022-02-01 DIAGNOSIS — R10.13 DYSPEPSIA: ICD-10-CM

## 2022-02-01 PROCEDURE — 99214 OFFICE O/P EST MOD 30 MIN: CPT | Performed by: INTERNAL MEDICINE

## 2022-02-01 RX ORDER — DESIPRAMINE HYDROCHLORIDE 10 MG/1
3 TABLET TABLET, SUGAR COATED ORAL
Qty: 270 | Refills: 1 | OUTPATIENT

## 2022-02-01 RX ORDER — DULOXETINE HCL 20 MG
TAKE 1 CAPSULE (20 MG) BY ORAL ROUTE 2 TIMES PER DAY CAPSULE,DELAYED RELEASE (ENTERIC COATED) ORAL 2
Qty: 0 | Refills: 0 | Status: ACTIVE | COMMUNITY
Start: 1900-01-01

## 2022-02-01 RX ORDER — OMEPRAZOLE 40 MG/1
TAKE 1 CAPSULE 30 MINUTES BEFORE MORNING MEAL ORALLY ONCE A DAY 30 DAY(S) CAPSULE, DELAYED RELEASE ORAL
Qty: 90 CAPSULE | Refills: 1 | Status: ON HOLD | COMMUNITY

## 2022-02-01 RX ORDER — FAMOTIDINE 20 MG/1
1 TABLET TWICE A DAY TABLET, FILM COATED ORAL TWICE A DAY
Qty: 60 TABLET | Refills: 5 | OUTPATIENT

## 2022-02-01 RX ORDER — DESIPRAMINE HYDROCHLORIDE 10 MG/1
1 TABLET TABLET, SUGAR COATED ORAL ONCE A DAY
Qty: 90 | Refills: 0 | Status: ON HOLD | COMMUNITY

## 2022-02-01 RX ORDER — DESIPRAMINE HYDROCHLORIDE 10 MG/1
TAKE 2 TABLETS ORALLY ONCE A DAY 90 DAYS TABLET, FILM COATED ORAL
Qty: 180 TABLET | Refills: 1 | Status: ACTIVE | COMMUNITY

## 2022-02-01 RX ORDER — OMEPRAZOLE 40 MG/1
1 CAPSULE 30 MINUTES BEFORE MORNING MEAL CAPSULE, DELAYED RELEASE ORAL ONCE A DAY
Qty: 30 | Refills: 1 | Status: ON HOLD | COMMUNITY

## 2022-02-01 RX ORDER — FAMOTIDINE 40 MG/1
TAKE 1 TABLET BY MOUTH AT BEDTIME TABLET, FILM COATED ORAL
Qty: 90 TABLET | Refills: 2 | Status: ON HOLD | COMMUNITY

## 2022-02-01 RX ORDER — FAMOTIDINE 40 MG/1
1 TABLET AT BEDTIME TABLET, FILM COATED ORAL ONCE A DAY
Qty: 30 | Status: ON HOLD | COMMUNITY

## 2022-02-01 NOTE — HPI-OTHER HISTORIES
This is a 21yoF who presents for f/u of gastroparesis, GERD and IBS-D.    EGD 1/30/2019 by Dr. Saldaña revealed a small HH and gastritis, neg for H. pylori and celiac.    RUQ U/S 2/2019 WNL.  She  was to [facility] in 2019 for eval of chronic abd pain, hematochezia and weight loss. Records reviewed: EGD few superficial gastric and duodenal ulcers, bx neg for HP; Colon same day with discontinuous areas of nonbleeding ulcerated mucosa in rectum, sigmoid and cecum, normal TI. bx + increased lymphoplastmacytic inflammation in the cecum and ascending colon but no granuloma, crypt distortion or active inflammation. Capsule endoscopy with small ulcers of stomach, duodenum and colon, MRE with no evidence of IBD; Calprotectin 181, CRP 4.6. GES 7/26/19 c/w gastroparesis. IBD panel not c/w IBD.  Repeat EGD/colon 5/15/2020: normal to TI and normal stomach, esophagus and duodenum, Bx gastritis without HP and neg ileitis and colitis  She was given desipramine 20mg w/ improvement in IBS/dyspepsia and anxiety but then had increase in GERD sx and also given PPI and H2B and did well but now notes she went off PPI and H2B about 4m ago (still on 20mg TCA at night) and now notes increased chest pressure X 2-3 weeks, central and non-radiating, with associated increase in heartburn. Some post-prandial fullness and nausea with "larger meals." Thinks triggered by stress and less healthy eating. Stools have been slightly loose for the last few weeks but non-bloody. Mild lower abd pain.

## 2022-02-24 ENCOUNTER — ERX REFILL RESPONSE (OUTPATIENT)
Dept: URBAN - METROPOLITAN AREA CLINIC 92 | Facility: CLINIC | Age: 22
End: 2022-02-24

## 2022-02-24 RX ORDER — FAMOTIDINE 20 MG/1
TAKE 1 TABLET BY MOUTH TWICE A DAY TABLET, FILM COATED ORAL
Qty: 60 TABLET | Refills: 6 | OUTPATIENT

## 2022-02-24 RX ORDER — FAMOTIDINE 20 MG/1
1 TABLET TWICE A DAY TABLET, FILM COATED ORAL TWICE A DAY
Qty: 60 TABLET | Refills: 5 | OUTPATIENT

## 2022-03-15 ENCOUNTER — OFFICE VISIT (OUTPATIENT)
Dept: URBAN - METROPOLITAN AREA TELEHEALTH 2 | Facility: TELEHEALTH | Age: 22
End: 2022-03-15
Payer: COMMERCIAL

## 2022-03-15 VITALS — HEIGHT: 67 IN | WEIGHT: 135 LBS | BODY MASS INDEX: 21.19 KG/M2

## 2022-03-15 DIAGNOSIS — K58.0 IRRITABLE BOWEL SYNDROME WITH DIARRHEA: ICD-10-CM

## 2022-03-15 DIAGNOSIS — R10.13 DYSPEPSIA: ICD-10-CM

## 2022-03-15 DIAGNOSIS — R11.0 NAUSEA: ICD-10-CM

## 2022-03-15 DIAGNOSIS — K29.90 GASTRITIS AND DUODENITIS: ICD-10-CM

## 2022-03-15 PROCEDURE — 99214 OFFICE O/P EST MOD 30 MIN: CPT | Performed by: INTERNAL MEDICINE

## 2022-03-15 RX ORDER — DESIPRAMINE HYDROCHLORIDE 10 MG/1
3 TABLET TABLET, SUGAR COATED ORAL
Qty: 270 | Refills: 1 | Status: ON HOLD | COMMUNITY

## 2022-03-15 RX ORDER — OMEPRAZOLE 40 MG/1
1 CAPSULE 30 MINUTES BEFORE MORNING MEAL CAPSULE, DELAYED RELEASE ORAL ONCE A DAY
Qty: 30 | Refills: 1 | COMMUNITY

## 2022-03-15 RX ORDER — DESIPRAMINE HYDROCHLORIDE 10 MG/1
1 TABLET TABLET, SUGAR COATED ORAL ONCE A DAY
Qty: 90 | Refills: 0 | COMMUNITY

## 2022-03-15 RX ORDER — DULOXETINE HYDROCHLORIDE 60 MG/1
1 CAPSULE CAPSULE, DELAYED RELEASE ORAL ONCE A DAY
Status: ACTIVE | COMMUNITY

## 2022-03-15 RX ORDER — FAMOTIDINE 20 MG/1
TAKE 1 TABLET BY MOUTH TWICE A DAY TABLET, FILM COATED ORAL
Qty: 60 TABLET | Refills: 6 | Status: ACTIVE | COMMUNITY

## 2022-03-15 RX ORDER — FAMOTIDINE 40 MG/1
TAKE 1 TABLET BY MOUTH AT BEDTIME TABLET, FILM COATED ORAL
Qty: 90 TABLET | Refills: 2 | COMMUNITY

## 2022-03-15 RX ORDER — FAMOTIDINE 20 MG/1
1 TABLET TWICE A DAY TABLET, FILM COATED ORAL TWICE A DAY
Qty: 60 TABLET | Refills: 5 | OUTPATIENT

## 2022-03-15 RX ORDER — DESIPRAMINE HYDROCHLORIDE 10 MG/1
3 TABLET TABLET, SUGAR COATED ORAL
Qty: 270 | Refills: 1 | OUTPATIENT

## 2022-03-15 RX ORDER — DESIPRAMINE HYDROCHLORIDE 10 MG/1
TAKE 2 TABLETS ORALLY ONCE A DAY 90 DAYS TABLET, FILM COATED ORAL
Qty: 180 TABLET | Refills: 1 | Status: ACTIVE | COMMUNITY

## 2022-03-15 RX ORDER — OMEPRAZOLE 40 MG/1
TAKE 1 CAPSULE 30 MINUTES BEFORE MORNING MEAL ORALLY ONCE A DAY 30 DAY(S) CAPSULE, DELAYED RELEASE ORAL
Qty: 90 CAPSULE | Refills: 1 | COMMUNITY

## 2022-03-15 RX ORDER — DULOXETINE HCL 20 MG
TAKE 1 CAPSULE (20 MG) BY ORAL ROUTE 2 TIMES PER DAY CAPSULE,DELAYED RELEASE (ENTERIC COATED) ORAL 2
Qty: 0 | Refills: 0 | Status: ON HOLD | COMMUNITY
Start: 1900-01-01

## 2022-07-29 ENCOUNTER — OFFICE VISIT (OUTPATIENT)
Dept: URBAN - METROPOLITAN AREA TELEHEALTH 2 | Facility: TELEHEALTH | Age: 22
End: 2022-07-29

## 2022-08-01 ENCOUNTER — OFFICE VISIT (OUTPATIENT)
Dept: URBAN - METROPOLITAN AREA TELEHEALTH 2 | Facility: TELEHEALTH | Age: 22
End: 2022-08-01
Payer: COMMERCIAL

## 2022-08-01 VITALS — BODY MASS INDEX: 21.19 KG/M2 | HEIGHT: 67 IN | WEIGHT: 135 LBS

## 2022-08-01 DIAGNOSIS — R11.0 NAUSEA: ICD-10-CM

## 2022-08-01 DIAGNOSIS — F41.9 ANXIETY: ICD-10-CM

## 2022-08-01 DIAGNOSIS — R10.13 DYSPEPSIA: ICD-10-CM

## 2022-08-01 DIAGNOSIS — K58.9 IRRITABLE BOWEL SYNDROME, UNSPECIFIED TYPE: ICD-10-CM

## 2022-08-01 PROCEDURE — 99213 OFFICE O/P EST LOW 20 MIN: CPT | Performed by: INTERNAL MEDICINE

## 2022-08-01 RX ORDER — FAMOTIDINE 20 MG/1
TAKE 1 TABLET BY MOUTH TWICE A DAY TABLET, FILM COATED ORAL
Qty: 60 TABLET | Refills: 6 | Status: ACTIVE | COMMUNITY

## 2022-08-01 RX ORDER — DESIPRAMINE HYDROCHLORIDE 10 MG/1
3 TABLET TABLET, SUGAR COATED ORAL
Qty: 270 | Refills: 1 | OUTPATIENT

## 2022-08-01 RX ORDER — FAMOTIDINE 20 MG/1
1 TABLET ONCE A DAY TABLET, FILM COATED ORAL ONCE A DAY
Qty: 30 | Refills: 6 | OUTPATIENT

## 2022-08-01 RX ORDER — DESIPRAMINE HYDROCHLORIDE 10 MG/1
TAKE 2 TABLETS ORALLY ONCE A DAY 90 DAYS TABLET, FILM COATED ORAL
Qty: 180 TABLET | Refills: 1 | Status: ACTIVE | COMMUNITY

## 2022-08-01 RX ORDER — DULOXETINE HYDROCHLORIDE 60 MG/1
1 CAPSULE CAPSULE, DELAYED RELEASE ORAL ONCE A DAY
Status: ACTIVE | COMMUNITY

## 2022-08-01 NOTE — HPI-OTHER HISTORIES
This is a 22yoF who presents for f/u of gastroparesis, GERD and IBS-D.       EGD 1/30/2019 by Dr. Saldaña revealed a small HH and gastritis, neg for H. pylori and celiac. RUQ U/S 2/2019 WNL.  She was admitted to [facility] in 2019 for eval of chronic abd pain, hematochezia and weight loss. Records reviewed: EGD few superficial gastric and duodenal ulcers, bx neg for HP; Colon same day with discontinuous areas of nonbleeding ulcerated mucosa in rectum, sigmoid and cecum, normal TI. bx + increased lymphoplastmacytic inflammation in the cecum and ascending colon but no granuloma, crypt distortion or active inflammation. Capsule endoscopy with small ulcers of stomach, duodenum and colon, MRE with no evidence of IBD; Calprotectin 181, CRP 4.6. GES 7/26/19 c/w gastroparesis. IBD panel not c/w IBD.  Repeat EGD/colon 5/15/2020: normal to TI and normal stomach, esophagus and duodenum, Bx gastritis without HP and neg ileitis and colitis   She was given desipramine 20mg w/ improvement in IBS/dyspepsia and anxiety but then had increase in GERD sx, given PPI and H2B with good response. Anxiety increased earlier this year and we increased TCA to 30 and restarted BID H2B and did well but came off the H2B recently and has had a slight increase in nausea, bloating, heartburn and post-prandial fullness.  She did xifaxin for 2 weeks and now back to normal diet (off fodmap) and BMs daily and formed. No GIB. Feels slight fatigue during day with TCA 30mg

## 2022-08-25 ENCOUNTER — WEB ENCOUNTER (OUTPATIENT)
Dept: URBAN - METROPOLITAN AREA CLINIC 92 | Facility: CLINIC | Age: 22
End: 2022-08-25

## 2022-08-26 ENCOUNTER — DASHBOARD ENCOUNTERS (OUTPATIENT)
Age: 22
End: 2022-08-26

## 2022-08-30 ENCOUNTER — OFFICE VISIT (OUTPATIENT)
Dept: URBAN - METROPOLITAN AREA TELEHEALTH 2 | Facility: TELEHEALTH | Age: 22
End: 2022-08-30
Payer: COMMERCIAL

## 2022-08-30 VITALS — HEIGHT: 67 IN | BODY MASS INDEX: 21.19 KG/M2 | WEIGHT: 135 LBS

## 2022-08-30 DIAGNOSIS — R11.0 NAUSEA: ICD-10-CM

## 2022-08-30 DIAGNOSIS — R12 HEARTBURN: ICD-10-CM

## 2022-08-30 DIAGNOSIS — R10.13 DYSPEPSIA: ICD-10-CM

## 2022-08-30 DIAGNOSIS — K58.9 IRRITABLE BOWEL SYNDROME, UNSPECIFIED TYPE: ICD-10-CM

## 2022-08-30 PROBLEM — 196731005: Status: ACTIVE | Noted: 2020-07-29

## 2022-08-30 PROBLEM — 48694002: Status: ACTIVE | Noted: 2020-07-29

## 2022-08-30 PROBLEM — 197125005: Status: ACTIVE | Noted: 2022-03-15

## 2022-08-30 PROBLEM — 235675006: Status: ACTIVE | Noted: 2020-07-29

## 2022-08-30 PROBLEM — 162031009: Status: ACTIVE | Noted: 2020-07-29

## 2022-08-30 PROBLEM — 10743008: Status: ACTIVE | Noted: 2020-07-29

## 2022-08-30 PROCEDURE — 99213 OFFICE O/P EST LOW 20 MIN: CPT | Performed by: INTERNAL MEDICINE

## 2022-08-30 RX ORDER — FAMOTIDINE 20 MG/1
TAKE 1 TABLET BY MOUTH TWICE A DAY TABLET, FILM COATED ORAL
Qty: 60 TABLET | Refills: 6 | Status: ACTIVE | COMMUNITY

## 2022-08-30 RX ORDER — DULOXETINE HYDROCHLORIDE 60 MG/1
1 CAPSULE CAPSULE, DELAYED RELEASE ORAL ONCE A DAY
Status: ACTIVE | COMMUNITY

## 2022-08-30 RX ORDER — FAMOTIDINE 20 MG/1
1 TABLET ONCE A DAY TABLET, FILM COATED ORAL ONCE A DAY
Qty: 30 | Refills: 6 | OUTPATIENT

## 2022-08-30 RX ORDER — DESIPRAMINE HYDROCHLORIDE 10 MG/1
TAKE 2 TABLETS ORALLY ONCE A DAY 90 DAYS TABLET, FILM COATED ORAL
Qty: 180 TABLET | Refills: 1 | Status: ACTIVE | COMMUNITY

## 2022-08-30 NOTE — HPI-OTHER HISTORIES
This is a 22yoF who presents for f/u of gastroparesis, GERD and IBS-D.        EGD 1/30/2019 by Dr. Saldaña revealed a small HH and gastritis, neg for H. pylori and celiac.  RUQ U/S 2/2019 WNL.   She was admitted to [facility] in 2019 for eval of chronic abd pain, hematochezia and weight loss. Records reviewed: EGD few superficial gastric and duodenal ulcers, bx neg for HP; Colon same day with discontinuous areas of nonbleeding ulcerated mucosa in rectum, sigmoid and cecum, normal TI. bx + increased lymphoplastmacytic inflammation in the cecum and ascending colon but no granuloma, crypt distortion or active inflammation. Capsule endoscopy with small ulcers of stomach, duodenum and colon, MRE with no evidence of IBD; Calprotectin 181, CRP 4.6. GES 7/26/19 c/w gastroparesis. IBD panel not c/w IBD.  Repeat EGD/colon 5/15/2020: normal to TI and normal stomach, esophagus and duodenum, Bx gastritis without HP and neg ileitis and colitis    She was given desipramine 20mg w/ improvement in IBS/dyspepsia and anxiety but then had increase in GERD sx, given PPI and H2B with good response. Anxiety increased earlier this year and we increased TCA to 30 and restarted BID H2B and despite this has had intermittent nausea, bloating, heartburn and post-prandial fullness.    She did xifaxin for 2 weeks with improvement in loose stools earlier this year.  Feels sign fatigue as well as "difficuly eating" and thinks may be related to the TCA and wants to come off this. She is adhering to gastroparesis diet but notes with a few bites of food she gets full and feels like her body isnt hungry and able to more. Denies nausea or vomiting. Unsure of her weight. Does not want to be on meds with SE. She is having BMs every 1-2 days, loose but not urgent. No hematochezia, melena, nocturnal stools.  On BID H2B and occasionally has regurg but no heartburn or dysphagia. No NSAID use.  "[employer] 2 way video was used for this visit

## 2022-11-15 ENCOUNTER — ERX REFILL RESPONSE (OUTPATIENT)
Dept: URBAN - METROPOLITAN AREA CLINIC 92 | Facility: CLINIC | Age: 22
End: 2022-11-15

## 2022-11-15 RX ORDER — FAMOTIDINE 20 MG/1
1 TABLET ONCE A DAY TABLET, FILM COATED ORAL ONCE A DAY
Qty: 30 | Refills: 6 | OUTPATIENT

## 2022-11-15 RX ORDER — FAMOTIDINE 20 MG/1
TAKE 1 TABLET BY MOUTH EVERY DAY TABLET, FILM COATED ORAL
Qty: 30 TABLET | Refills: 6 | OUTPATIENT